# Patient Record
Sex: FEMALE | Race: ASIAN | NOT HISPANIC OR LATINO | URBAN - METROPOLITAN AREA
[De-identification: names, ages, dates, MRNs, and addresses within clinical notes are randomized per-mention and may not be internally consistent; named-entity substitution may affect disease eponyms.]

---

## 2022-03-03 ENCOUNTER — EMERGENCY (EMERGENCY)
Facility: HOSPITAL | Age: 32
LOS: 1 days | Discharge: HOME | End: 2022-03-03
Attending: EMERGENCY MEDICINE | Admitting: EMERGENCY MEDICINE
Payer: MEDICAID

## 2022-03-03 VITALS
RESPIRATION RATE: 16 BRPM | OXYGEN SATURATION: 100 % | TEMPERATURE: 98 F | DIASTOLIC BLOOD PRESSURE: 85 MMHG | HEART RATE: 94 BPM | SYSTOLIC BLOOD PRESSURE: 118 MMHG

## 2022-03-03 VITALS
RESPIRATION RATE: 18 BRPM | OXYGEN SATURATION: 100 % | SYSTOLIC BLOOD PRESSURE: 126 MMHG | HEART RATE: 84 BPM | TEMPERATURE: 98 F | DIASTOLIC BLOOD PRESSURE: 83 MMHG | WEIGHT: 132.28 LBS

## 2022-03-03 LAB
ALBUMIN SERPL ELPH-MCNC: 4 G/DL — SIGNIFICANT CHANGE UP (ref 3.3–5)
ALP SERPL-CCNC: 123 U/L — HIGH (ref 40–120)
ALT FLD-CCNC: 25 U/L — SIGNIFICANT CHANGE UP (ref 12–78)
ANION GAP SERPL CALC-SCNC: 5 MMOL/L — SIGNIFICANT CHANGE UP (ref 5–17)
APPEARANCE UR: ABNORMAL
AST SERPL-CCNC: 15 U/L — SIGNIFICANT CHANGE UP (ref 15–37)
BASOPHILS # BLD AUTO: 0.05 K/UL — SIGNIFICANT CHANGE UP (ref 0–0.2)
BASOPHILS NFR BLD AUTO: 0.7 % — SIGNIFICANT CHANGE UP (ref 0–2)
BILIRUB SERPL-MCNC: 0.3 MG/DL — SIGNIFICANT CHANGE UP (ref 0.2–1.2)
BILIRUB UR-MCNC: NEGATIVE — SIGNIFICANT CHANGE UP
BUN SERPL-MCNC: 8 MG/DL — SIGNIFICANT CHANGE UP (ref 7–23)
CALCIUM SERPL-MCNC: 9.2 MG/DL — SIGNIFICANT CHANGE UP (ref 8.5–10.1)
CHLORIDE SERPL-SCNC: 108 MMOL/L — SIGNIFICANT CHANGE UP (ref 96–108)
CO2 SERPL-SCNC: 27 MMOL/L — SIGNIFICANT CHANGE UP (ref 22–31)
COLOR SPEC: ABNORMAL
CREAT SERPL-MCNC: 0.68 MG/DL — SIGNIFICANT CHANGE UP (ref 0.5–1.3)
DIFF PNL FLD: ABNORMAL
EGFR: 119 ML/MIN/1.73M2 — SIGNIFICANT CHANGE UP
EOSINOPHIL # BLD AUTO: 0.23 K/UL — SIGNIFICANT CHANGE UP (ref 0–0.5)
EOSINOPHIL NFR BLD AUTO: 3.1 % — SIGNIFICANT CHANGE UP (ref 0–6)
GLUCOSE SERPL-MCNC: 94 MG/DL — SIGNIFICANT CHANGE UP (ref 70–99)
GLUCOSE UR QL: NEGATIVE — SIGNIFICANT CHANGE UP
HCG SERPL-ACNC: <1 MIU/ML — SIGNIFICANT CHANGE UP
HCT VFR BLD CALC: 37.4 % — SIGNIFICANT CHANGE UP (ref 34.5–45)
HGB BLD-MCNC: 12.6 G/DL — SIGNIFICANT CHANGE UP (ref 11.5–15.5)
IMM GRANULOCYTES NFR BLD AUTO: 0.7 % — SIGNIFICANT CHANGE UP (ref 0–1.5)
KETONES UR-MCNC: ABNORMAL
LEUKOCYTE ESTERASE UR-ACNC: ABNORMAL
LIDOCAIN IGE QN: 277 U/L — SIGNIFICANT CHANGE UP (ref 73–393)
LYMPHOCYTES # BLD AUTO: 2.1 K/UL — SIGNIFICANT CHANGE UP (ref 1–3.3)
LYMPHOCYTES # BLD AUTO: 28.7 % — SIGNIFICANT CHANGE UP (ref 13–44)
MCHC RBC-ENTMCNC: 29.8 PG — SIGNIFICANT CHANGE UP (ref 27–34)
MCHC RBC-ENTMCNC: 33.7 GM/DL — SIGNIFICANT CHANGE UP (ref 32–36)
MCV RBC AUTO: 88.4 FL — SIGNIFICANT CHANGE UP (ref 80–100)
MONOCYTES # BLD AUTO: 0.62 K/UL — SIGNIFICANT CHANGE UP (ref 0–0.9)
MONOCYTES NFR BLD AUTO: 8.5 % — SIGNIFICANT CHANGE UP (ref 2–14)
NEUTROPHILS # BLD AUTO: 4.26 K/UL — SIGNIFICANT CHANGE UP (ref 1.8–7.4)
NEUTROPHILS NFR BLD AUTO: 58.3 % — SIGNIFICANT CHANGE UP (ref 43–77)
NITRITE UR-MCNC: NEGATIVE — SIGNIFICANT CHANGE UP
NRBC # BLD: 0 /100 WBCS — SIGNIFICANT CHANGE UP (ref 0–0)
PH UR: 7 — SIGNIFICANT CHANGE UP (ref 5–8)
PLATELET # BLD AUTO: 291 K/UL — SIGNIFICANT CHANGE UP (ref 150–400)
POTASSIUM SERPL-MCNC: 4 MMOL/L — SIGNIFICANT CHANGE UP (ref 3.5–5.3)
POTASSIUM SERPL-SCNC: 4 MMOL/L — SIGNIFICANT CHANGE UP (ref 3.5–5.3)
PROT SERPL-MCNC: 7.5 G/DL — SIGNIFICANT CHANGE UP (ref 6–8.3)
PROT UR-MCNC: 100
RBC # BLD: 4.23 M/UL — SIGNIFICANT CHANGE UP (ref 3.8–5.2)
RBC # FLD: 13.2 % — SIGNIFICANT CHANGE UP (ref 10.3–14.5)
SODIUM SERPL-SCNC: 140 MMOL/L — SIGNIFICANT CHANGE UP (ref 135–145)
SP GR SPEC: 1.01 — SIGNIFICANT CHANGE UP (ref 1.01–1.02)
UROBILINOGEN FLD QL: NEGATIVE — SIGNIFICANT CHANGE UP
WBC # BLD: 7.31 K/UL — SIGNIFICANT CHANGE UP (ref 3.8–10.5)
WBC # FLD AUTO: 7.31 K/UL — SIGNIFICANT CHANGE UP (ref 3.8–10.5)

## 2022-03-03 PROCEDURE — 81001 URINALYSIS AUTO W/SCOPE: CPT

## 2022-03-03 PROCEDURE — 96366 THER/PROPH/DIAG IV INF ADDON: CPT

## 2022-03-03 PROCEDURE — 76830 TRANSVAGINAL US NON-OB: CPT | Mod: 26

## 2022-03-03 PROCEDURE — 80053 COMPREHEN METABOLIC PANEL: CPT

## 2022-03-03 PROCEDURE — 84702 CHORIONIC GONADOTROPIN TEST: CPT

## 2022-03-03 PROCEDURE — 74177 CT ABD & PELVIS W/CONTRAST: CPT | Mod: MA

## 2022-03-03 PROCEDURE — 85025 COMPLETE CBC W/AUTO DIFF WBC: CPT

## 2022-03-03 PROCEDURE — 74177 CT ABD & PELVIS W/CONTRAST: CPT | Mod: 26,MA

## 2022-03-03 PROCEDURE — 96365 THER/PROPH/DIAG IV INF INIT: CPT | Mod: XU

## 2022-03-03 PROCEDURE — 99285 EMERGENCY DEPT VISIT HI MDM: CPT

## 2022-03-03 PROCEDURE — 76830 TRANSVAGINAL US NON-OB: CPT

## 2022-03-03 PROCEDURE — 36415 COLL VENOUS BLD VENIPUNCTURE: CPT

## 2022-03-03 PROCEDURE — 83690 ASSAY OF LIPASE: CPT

## 2022-03-03 PROCEDURE — 96375 TX/PRO/DX INJ NEW DRUG ADDON: CPT

## 2022-03-03 PROCEDURE — 99284 EMERGENCY DEPT VISIT MOD MDM: CPT | Mod: 25

## 2022-03-03 RX ORDER — OXYCODONE AND ACETAMINOPHEN 5; 325 MG/1; MG/1
1 TABLET ORAL
Qty: 9 | Refills: 0
Start: 2022-03-03 | End: 2022-03-05

## 2022-03-03 RX ORDER — ACETAMINOPHEN 500 MG
1000 TABLET ORAL ONCE
Refills: 0 | Status: COMPLETED | OUTPATIENT
Start: 2022-03-03 | End: 2022-03-03

## 2022-03-03 RX ORDER — CEFUROXIME AXETIL 250 MG
1 TABLET ORAL
Qty: 14 | Refills: 0
Start: 2022-03-03 | End: 2022-03-09

## 2022-03-03 RX ORDER — CEFTRIAXONE 500 MG/1
1000 INJECTION, POWDER, FOR SOLUTION INTRAMUSCULAR; INTRAVENOUS ONCE
Refills: 0 | Status: COMPLETED | OUTPATIENT
Start: 2022-03-03 | End: 2022-03-03

## 2022-03-03 RX ORDER — TAMSULOSIN HYDROCHLORIDE 0.4 MG/1
1 CAPSULE ORAL
Qty: 7 | Refills: 0
Start: 2022-03-03 | End: 2022-03-09

## 2022-03-03 RX ADMIN — CEFTRIAXONE 100 MILLIGRAM(S): 500 INJECTION, POWDER, FOR SOLUTION INTRAMUSCULAR; INTRAVENOUS at 18:46

## 2022-03-03 RX ADMIN — Medication 400 MILLIGRAM(S): at 16:22

## 2022-03-03 RX ADMIN — Medication 1000 MILLIGRAM(S): at 18:45

## 2022-03-03 NOTE — ED PROVIDER NOTE - CARE PROVIDER_API CALL
Leroy Khan  UROLOGY  [address], [address]  [place] [address]  Phone: [phone]  Fax: [phone]  Follow Up Time: 1-3 Days

## 2022-03-03 NOTE — ED PROVIDER NOTE - NSFOLLOWUPINSTRUCTIONS_ED_ALL_ED_FT
Followup with Urology. Return for worsening pain, fever, vomiting, worsening condition. Follow up with urology.     Kidney Stones    WHAT YOU NEED TO KNOW:    What is a kidney stone? Kidney stones form in the urinary system when the water and waste in your urine are out of balance. When this happens, certain types of waste crystals separate from the urine. The crystals build up and form kidney stones. Kidney stones can be made of uric acid, calcium, phosphate, or oxalate crystals. You may have more than one kidney stone.    Kidney Stones          What increases my risk for kidney stones?   •Not drinking enough liquids (especially water) each day      •Having urinary tract infections often      •Too much of certain foods, such as meat, salt, nuts, and chocolate      •Obesity      •Certain medicines, such as diuretics, steroids, and antacids      •A family history of kidney stones      •Being born with a kidney or bowel disorder      What are the signs and symptoms of kidney stones?   •Pain in the middle of your back that moves across to your side or that may spread to your groin      •Nausea and vomiting      •Urge to urinate often, burning feeling when you urinate, or pink or red urine      •Tenderness in your lower back, side, or stomach      How are kidney stones diagnosed? Your healthcare provider will ask about your health and usual foods. He or she may refer you to a urologist. You may need tests to find out what type of kidney stones you have. Tests can show the size of your kidney stones and where they are in your urinary system. You may need more than one of the following:  •Urine tests may show if you have blood in your urine. They may also show high amounts of the substances that form kidney stones, such as uric acid.      •Blood tests show how well your kidneys are working. They may also be used to check the levels of calcium or uric acid in your blood.      •X-ray or ultrasound pictures may be taken of your kidneys, bladder, and ureters. You may be given contrast liquid before an x-ray to help these show up better in the pictures. You may need to have more than one x-ray. Tell the healthcare provider if you have ever had an allergic reaction to contrast liquid.      How are kidney stones treated?   •NSAIDs, such as ibuprofen, help decrease swelling, pain, and fever. This medicine is available with or without a doctor's order. NSAIDs can cause stomach bleeding or kidney problems in certain people. If you take blood thinner medicine, always ask your healthcare provider if NSAIDs are safe for you. Always read the medicine label and follow directions.      •Acetaminophen decreases pain and fever. It is available without a doctor's order. Ask how much to take and how often to take it. Follow directions. Read the labels of all other medicines you are using to see if they also contain acetaminophen, or ask your doctor or pharmacist. Acetaminophen can cause liver damage if not taken correctly. Do not use more than 4 grams (4,000 milligrams) total of acetaminophen in one day.       •Prescription pain medicine may be given. Ask your healthcare provider how to take this medicine safely. Some prescription pain medicines contain acetaminophen. Do not take other medicines that contain acetaminophen without talking to your healthcare provider. Too much acetaminophen may cause liver damage. Prescription pain medicine may cause constipation. Ask your healthcare provider how to prevent or treat constipation.       •Medicines to balance your electrolytes may be needed.      •A procedure or surgery to remove the kidney stones may be needed if they do not pass on their own. Your treatment will depend on the size and location of your kidney stones.      What can I do to manage kidney stones?   •Drink more liquids. Your healthcare provider may tell you to drink at least 8 to 12 (eight-ounce) cups of liquids each day. This helps flush out the kidney stones when you urinate. Water is the best liquid to drink.      •Strain your urine every time you go to the bathroom. Urinate through a strainer or a piece of thin cloth to catch the stones. Take the stones to your healthcare provider so they can be sent to the lab for tests. This will help your healthcare providers plan the best treatment for you.  Look for Stones in the Filter           •Ask if you should avoid any foods. You may need to limit oxalate. Oxalate is a chemical found in some plant foods. The most common type of kidney stone is made up of crystals that contain calcium and oxalate. Your healthcare provider or dietitian may recommend that you limit oxalate if you get this type of kidney stone often. You may need to limit how much sodium (salt) or protein you eat. Ask for information about the best foods for you.  High Oxalate Foods           •Be physically active as directed. Your stones may pass more easily if you stay active. Physical activity can also help you manage your weight. Ask about the best activities for you.   Family Walking for Exercise           When should I seek immediate care?   •You are vomiting and it is not relieved with medicine.          When should I call my doctor?   •You have a fever.      •You have trouble urinating.      •You see blood in your urine.      •You have severe pain.      •You have any questions or concerns about your condition or care.      CARE AGREEMENT:    You have the right to help plan your care. Learn about your health condition and how it may be treated. Discuss treatment options with your healthcare providers to decide what care you want to receive. You always have the right to refuse treatment.

## 2022-03-03 NOTE — ED PROVIDER NOTE - OBJECTIVE STATEMENT
32 y/o female without reported PMHx presents today due to left sided abdominal pain x 10 days. pt reports she started her menstruation earlier on the 19th of february. pt reports she menstruated then had hematuria. pt has an US which noted mild kidney abnormality but uncertain of what exactly. pt describes pain as aching, non-radiating, and currently 10/10. pt denies cp, sob, vomiting, diarrhea, hematochezia, dysuria, prior abdominal surgery, or any other complaints.

## 2022-03-03 NOTE — ED ADULT NURSE NOTE - OBJECTIVE STATEMENT
patient came in ED from home with c/o lower abdominal pain. patient stated a month ago she have LUQ abdominal pain. PMD made aware and ultrasound of the abdomen was completed. yesterday patient developed  lower abdominal pain. patient added LMP was Feb 19 and after the period completed patient developed hematuria and left flank pain.

## 2022-03-03 NOTE — ED PROVIDER NOTE - ATTENDING CONTRIBUTION TO CARE
31 female presents to ER c/o left sided abdominal pain, hematuria for 10 days, denies vomiting, patient alert and oriented, heart and lungs clear, abdomen soft, tender to left upper and lower quadrant, f/u ct abdomen/pelvis r/o kidney stone, ua, labs, iv fluids, pain control, re-eval.

## 2022-03-03 NOTE — ED PROVIDER NOTE - PATIENT PORTAL LINK FT
You can access the FollowMyHealth Patient Portal offered by [facility] by registering at the following website: [email or web address]. By joining [employer]’s FollowMyHealth portal, you will also be able to view your health information using other applications (apps) compatible with our system.

## 2022-03-03 NOTE — ED PROVIDER NOTE - CLINICAL SUMMARY MEDICAL DECISION MAKING FREE TEXT BOX
presents today due to left sided abdominal pain x 10 days. pt reports she started her menstruation earlier on the 19th of february. pt reports she menstruated then had hematuria. pt has an US which noted mild kidney abnormality but uncertain of what exactly. plan includes labs, Ct renal stone hunt, UA r/o UTI, US pelvis, pain control, re-assess

## 2022-03-03 NOTE — ED PROVIDER NOTE - PHYSICAL EXAMINATION
Constitutional: Awake, Alert, non-toxic. NAD. Well appearing, well nourished.   HEAD: Normocephalic, atraumatic.   EYES: EOM intact, conjunctiva and sclera are clear bilaterally.   ENT: No rhinorrhea, patent, mucous membranes pink/moist, no drooling or stridor.   NECK: Supple, non-tender  CARDIOVASCULAR: Normal S1, S2; regular rate and rhythm.  RESPIRATORY: Normal respiratory effort; breath sounds CTAB, no wheezes, rhonchi, or rales. Speaking in full sentences. No accessory muscle use.   ABDOMEN: Soft; (+) left sided abdominal TTP, no guarding or rebound TTP. no CVAT.    EXTREMITIES: Full passive and active ROM in all extremities; non-tender to palpation; distal pulses palpable and symmetric  SKIN: Warm, dry; good skin turgor, no apparent lesions or rashes, no ecchymosis, brisk capillary refill.  NEURO: A&O x3. Sensory and motor functions are grossly intact. Speech is normal. Appearance and judgement seem appropriate for gender and age.

## 2022-11-04 PROBLEM — Z78.9 OTHER SPECIFIED HEALTH STATUS: Chronic | Status: ACTIVE | Noted: 2022-03-03

## 2022-11-21 PROBLEM — Z00.00 ENCOUNTER FOR PREVENTIVE HEALTH EXAMINATION: Status: ACTIVE | Noted: 2022-11-21

## 2022-11-29 ENCOUNTER — ASOB RESULT (OUTPATIENT)
Age: 32
End: 2022-11-29

## 2022-11-29 ENCOUNTER — APPOINTMENT (OUTPATIENT)
Dept: ANTEPARTUM | Facility: CLINIC | Age: 32
End: 2022-11-29

## 2022-11-29 ENCOUNTER — LABORATORY RESULT (OUTPATIENT)
Age: 32
End: 2022-11-29

## 2022-11-29 PROCEDURE — 76813 OB US NUCHAL MEAS 1 GEST: CPT

## 2022-11-29 PROCEDURE — 76801 OB US < 14 WKS SINGLE FETUS: CPT

## 2023-01-23 ENCOUNTER — ASOB RESULT (OUTPATIENT)
Age: 33
End: 2023-01-23

## 2023-01-23 ENCOUNTER — APPOINTMENT (OUTPATIENT)
Dept: ANTEPARTUM | Facility: CLINIC | Age: 33
End: 2023-01-23
Payer: MEDICAID

## 2023-01-23 PROCEDURE — 99202 OFFICE O/P NEW SF 15 MIN: CPT | Mod: 25

## 2023-01-23 PROCEDURE — 76805 OB US >/= 14 WKS SNGL FETUS: CPT

## 2023-03-06 ENCOUNTER — APPOINTMENT (OUTPATIENT)
Dept: ANTEPARTUM | Facility: CLINIC | Age: 33
End: 2023-03-06

## 2023-04-12 ENCOUNTER — APPOINTMENT (OUTPATIENT)
Dept: ANTEPARTUM | Facility: CLINIC | Age: 33
End: 2023-04-12
Payer: MEDICAID

## 2023-04-12 ENCOUNTER — ASOB RESULT (OUTPATIENT)
Age: 33
End: 2023-04-12

## 2023-04-12 PROCEDURE — 76817 TRANSVAGINAL US OBSTETRIC: CPT

## 2023-04-12 PROCEDURE — 76816 OB US FOLLOW-UP PER FETUS: CPT

## 2023-04-12 PROCEDURE — 76819 FETAL BIOPHYS PROFIL W/O NST: CPT

## 2023-05-14 ENCOUNTER — INPATIENT (INPATIENT)
Facility: HOSPITAL | Age: 33
LOS: 0 days | Discharge: HOME | End: 2023-05-15
Attending: OBSTETRICS & GYNECOLOGY | Admitting: OBSTETRICS & GYNECOLOGY

## 2023-05-14 ENCOUNTER — APPOINTMENT (OUTPATIENT)
Dept: ANTEPARTUM | Facility: CLINIC | Age: 33
End: 2023-05-14

## 2023-05-14 VITALS — DIASTOLIC BLOOD PRESSURE: 95 MMHG | SYSTOLIC BLOOD PRESSURE: 140 MMHG | HEART RATE: 62 BPM

## 2023-05-14 DIAGNOSIS — O13.9 GESTATIONAL [PREGNANCY-INDUCED] HYPERTENSION WITHOUT SIGNIFICANT PROTEINURIA, UNSPECIFIED TRIMESTER: ICD-10-CM

## 2023-05-14 DIAGNOSIS — O26.899 OTHER SPECIFIED PREGNANCY RELATED CONDITIONS, UNSPECIFIED TRIMESTER: ICD-10-CM

## 2023-05-14 LAB
ALBUMIN SERPL ELPH-MCNC: 3.2 G/DL — LOW (ref 3.3–5)
ALP SERPL-CCNC: 173 U/L — HIGH (ref 40–120)
ALT FLD-CCNC: 20 U/L — SIGNIFICANT CHANGE UP (ref 4–33)
ANION GAP SERPL CALC-SCNC: 14 MMOL/L — SIGNIFICANT CHANGE UP (ref 7–14)
APPEARANCE UR: ABNORMAL
APTT BLD: 27.8 SEC — SIGNIFICANT CHANGE UP (ref 27–36.3)
AST SERPL-CCNC: 22 U/L — SIGNIFICANT CHANGE UP (ref 4–32)
BACTERIA # UR AUTO: ABNORMAL
BASOPHILS # BLD AUTO: 0.04 K/UL — SIGNIFICANT CHANGE UP (ref 0–0.2)
BASOPHILS NFR BLD AUTO: 0.5 % — SIGNIFICANT CHANGE UP (ref 0–2)
BILIRUB SERPL-MCNC: 0.3 MG/DL — SIGNIFICANT CHANGE UP (ref 0.2–1.2)
BILIRUB UR-MCNC: NEGATIVE — SIGNIFICANT CHANGE UP
BLD GP AB SCN SERPL QL: NEGATIVE — SIGNIFICANT CHANGE UP
BUN SERPL-MCNC: 5 MG/DL — LOW (ref 7–23)
CALCIUM SERPL-MCNC: 8.8 MG/DL — SIGNIFICANT CHANGE UP (ref 8.4–10.5)
CHLORIDE SERPL-SCNC: 103 MMOL/L — SIGNIFICANT CHANGE UP (ref 98–107)
CO2 SERPL-SCNC: 16 MMOL/L — LOW (ref 22–31)
COLOR SPEC: YELLOW — SIGNIFICANT CHANGE UP
COVID-19 SPIKE DOMAIN AB INTERP: POSITIVE
COVID-19 SPIKE DOMAIN ANTIBODY RESULT: >250 U/ML — HIGH
CREAT ?TM UR-MCNC: 84 MG/DL — SIGNIFICANT CHANGE UP
CREAT SERPL-MCNC: 0.45 MG/DL — LOW (ref 0.5–1.3)
DIFF PNL FLD: ABNORMAL
EGFR: 130 ML/MIN/1.73M2 — SIGNIFICANT CHANGE UP
EOSINOPHIL # BLD AUTO: 0.15 K/UL — SIGNIFICANT CHANGE UP (ref 0–0.5)
EOSINOPHIL NFR BLD AUTO: 1.7 % — SIGNIFICANT CHANGE UP (ref 0–6)
EPI CELLS # UR: 4 /HPF — SIGNIFICANT CHANGE UP (ref 0–5)
FIBRINOGEN PPP-MCNC: 585 MG/DL — HIGH (ref 200–465)
GLUCOSE SERPL-MCNC: 97 MG/DL — SIGNIFICANT CHANGE UP (ref 70–99)
GLUCOSE UR QL: NEGATIVE — SIGNIFICANT CHANGE UP
HCT VFR BLD CALC: 42.8 % — SIGNIFICANT CHANGE UP (ref 34.5–45)
HGB BLD-MCNC: 14 G/DL — SIGNIFICANT CHANGE UP (ref 11.5–15.5)
HYALINE CASTS # UR AUTO: 0 /LPF — SIGNIFICANT CHANGE UP (ref 0–7)
IANC: 5.59 K/UL — SIGNIFICANT CHANGE UP (ref 1.8–7.4)
IMM GRANULOCYTES NFR BLD AUTO: 1.4 % — HIGH (ref 0–0.9)
INR BLD: 0.99 RATIO — SIGNIFICANT CHANGE UP (ref 0.88–1.16)
KETONES UR-MCNC: NEGATIVE — SIGNIFICANT CHANGE UP
LDH SERPL L TO P-CCNC: 170 U/L — SIGNIFICANT CHANGE UP (ref 135–225)
LEUKOCYTE ESTERASE UR-ACNC: NEGATIVE — SIGNIFICANT CHANGE UP
LYMPHOCYTES # BLD AUTO: 1.89 K/UL — SIGNIFICANT CHANGE UP (ref 1–3.3)
LYMPHOCYTES # BLD AUTO: 21.9 % — SIGNIFICANT CHANGE UP (ref 13–44)
MCHC RBC-ENTMCNC: 31 PG — SIGNIFICANT CHANGE UP (ref 27–34)
MCHC RBC-ENTMCNC: 32.7 GM/DL — SIGNIFICANT CHANGE UP (ref 32–36)
MCV RBC AUTO: 94.9 FL — SIGNIFICANT CHANGE UP (ref 80–100)
MONOCYTES # BLD AUTO: 0.83 K/UL — SIGNIFICANT CHANGE UP (ref 0–0.9)
MONOCYTES NFR BLD AUTO: 9.6 % — SIGNIFICANT CHANGE UP (ref 2–14)
NEUTROPHILS # BLD AUTO: 5.59 K/UL — SIGNIFICANT CHANGE UP (ref 1.8–7.4)
NEUTROPHILS NFR BLD AUTO: 64.9 % — SIGNIFICANT CHANGE UP (ref 43–77)
NITRITE UR-MCNC: NEGATIVE — SIGNIFICANT CHANGE UP
NRBC # BLD: 0 /100 WBCS — SIGNIFICANT CHANGE UP (ref 0–0)
NRBC # FLD: 0 K/UL — SIGNIFICANT CHANGE UP (ref 0–0)
PH UR: 6.5 — SIGNIFICANT CHANGE UP (ref 5–8)
PLATELET # BLD AUTO: 268 K/UL — SIGNIFICANT CHANGE UP (ref 150–400)
POTASSIUM SERPL-MCNC: 4.1 MMOL/L — SIGNIFICANT CHANGE UP (ref 3.5–5.3)
POTASSIUM SERPL-SCNC: 4.1 MMOL/L — SIGNIFICANT CHANGE UP (ref 3.5–5.3)
PROT ?TM UR-MCNC: 47 MG/DL — SIGNIFICANT CHANGE UP
PROT ?TM UR-MCNC: 47 MG/DL — SIGNIFICANT CHANGE UP
PROT SERPL-MCNC: 6.3 G/DL — SIGNIFICANT CHANGE UP (ref 6–8.3)
PROT UR-MCNC: ABNORMAL
PROT/CREAT UR-RTO: 0.6 RATIO — HIGH (ref 0–0.2)
PROTHROM AB SERPL-ACNC: 11.5 SEC — SIGNIFICANT CHANGE UP (ref 10.5–13.4)
RBC # BLD: 4.51 M/UL — SIGNIFICANT CHANGE UP (ref 3.8–5.2)
RBC # FLD: 13.3 % — SIGNIFICANT CHANGE UP (ref 10.3–14.5)
RBC CASTS # UR COMP ASSIST: 6 /HPF — HIGH (ref 0–4)
RH IG SCN BLD-IMP: POSITIVE — SIGNIFICANT CHANGE UP
RH IG SCN BLD-IMP: POSITIVE — SIGNIFICANT CHANGE UP
SARS-COV-2 IGG+IGM SERPL QL IA: >250 U/ML — HIGH
SARS-COV-2 IGG+IGM SERPL QL IA: POSITIVE
SODIUM SERPL-SCNC: 133 MMOL/L — LOW (ref 135–145)
SP GR SPEC: 1.02 — SIGNIFICANT CHANGE UP (ref 1.01–1.05)
T PALLIDUM AB TITR SER: NEGATIVE — SIGNIFICANT CHANGE UP
URATE SERPL-MCNC: 5.5 MG/DL — SIGNIFICANT CHANGE UP (ref 2.5–7)
UROBILINOGEN FLD QL: SIGNIFICANT CHANGE UP
WBC # BLD: 8.62 K/UL — SIGNIFICANT CHANGE UP (ref 3.8–10.5)
WBC # FLD AUTO: 8.62 K/UL — SIGNIFICANT CHANGE UP (ref 3.8–10.5)
WBC UR QL: 4 /HPF — SIGNIFICANT CHANGE UP (ref 0–5)

## 2023-05-14 RX ORDER — SIMETHICONE 80 MG/1
80 TABLET, CHEWABLE ORAL EVERY 4 HOURS
Refills: 0 | Status: DISCONTINUED | OUTPATIENT
Start: 2023-05-14 | End: 2023-05-15

## 2023-05-14 RX ORDER — TETANUS TOXOID, REDUCED DIPHTHERIA TOXOID AND ACELLULAR PERTUSSIS VACCINE, ADSORBED 5; 2.5; 8; 8; 2.5 [IU]/.5ML; [IU]/.5ML; UG/.5ML; UG/.5ML; UG/.5ML
0.5 SUSPENSION INTRAMUSCULAR ONCE
Refills: 0 | Status: DISCONTINUED | OUTPATIENT
Start: 2023-05-14 | End: 2023-05-15

## 2023-05-14 RX ORDER — DIPHENHYDRAMINE HCL 50 MG
25 CAPSULE ORAL EVERY 6 HOURS
Refills: 0 | Status: DISCONTINUED | OUTPATIENT
Start: 2023-05-14 | End: 2023-05-15

## 2023-05-14 RX ORDER — MAGNESIUM HYDROXIDE 400 MG/1
30 TABLET, CHEWABLE ORAL
Refills: 0 | Status: DISCONTINUED | OUTPATIENT
Start: 2023-05-14 | End: 2023-05-15

## 2023-05-14 RX ORDER — CHLORHEXIDINE GLUCONATE 213 G/1000ML
1 SOLUTION TOPICAL DAILY
Refills: 0 | Status: DISCONTINUED | OUTPATIENT
Start: 2023-05-14 | End: 2023-05-14

## 2023-05-14 RX ORDER — PRAMOXINE HYDROCHLORIDE 150 MG/15G
1 AEROSOL, FOAM RECTAL EVERY 4 HOURS
Refills: 0 | Status: DISCONTINUED | OUTPATIENT
Start: 2023-05-14 | End: 2023-05-15

## 2023-05-14 RX ORDER — KETOROLAC TROMETHAMINE 30 MG/ML
30 SYRINGE (ML) INJECTION ONCE
Refills: 0 | Status: DISCONTINUED | OUTPATIENT
Start: 2023-05-14 | End: 2023-05-14

## 2023-05-14 RX ORDER — DIBUCAINE 1 %
1 OINTMENT (GRAM) RECTAL EVERY 6 HOURS
Refills: 0 | Status: DISCONTINUED | OUTPATIENT
Start: 2023-05-14 | End: 2023-05-15

## 2023-05-14 RX ORDER — OXYTOCIN 10 UNIT/ML
VIAL (ML) INJECTION
Qty: 20 | Refills: 0 | Status: DISCONTINUED | OUTPATIENT
Start: 2023-05-14 | End: 2023-05-15

## 2023-05-14 RX ORDER — BENZOCAINE 10 %
1 GEL (GRAM) MUCOUS MEMBRANE EVERY 6 HOURS
Refills: 0 | Status: DISCONTINUED | OUTPATIENT
Start: 2023-05-14 | End: 2023-05-15

## 2023-05-14 RX ORDER — SODIUM CHLORIDE 9 MG/ML
1000 INJECTION, SOLUTION INTRAVENOUS
Refills: 0 | Status: DISCONTINUED | OUTPATIENT
Start: 2023-05-14 | End: 2023-05-14

## 2023-05-14 RX ORDER — LANOLIN
1 OINTMENT (GRAM) TOPICAL EVERY 6 HOURS
Refills: 0 | Status: DISCONTINUED | OUTPATIENT
Start: 2023-05-14 | End: 2023-05-15

## 2023-05-14 RX ORDER — AER TRAVELER 0.5 G/1
1 SOLUTION RECTAL; TOPICAL EVERY 4 HOURS
Refills: 0 | Status: DISCONTINUED | OUTPATIENT
Start: 2023-05-14 | End: 2023-05-15

## 2023-05-14 RX ORDER — OXYTOCIN 10 UNIT/ML
41.67 VIAL (ML) INJECTION
Qty: 20 | Refills: 0 | Status: DISCONTINUED | OUTPATIENT
Start: 2023-05-14 | End: 2023-05-15

## 2023-05-14 RX ORDER — OXYCODONE HYDROCHLORIDE 5 MG/1
5 TABLET ORAL
Refills: 0 | Status: DISCONTINUED | OUTPATIENT
Start: 2023-05-14 | End: 2023-05-15

## 2023-05-14 RX ORDER — HYDROCORTISONE 1 %
1 OINTMENT (GRAM) TOPICAL EVERY 6 HOURS
Refills: 0 | Status: DISCONTINUED | OUTPATIENT
Start: 2023-05-14 | End: 2023-05-15

## 2023-05-14 RX ORDER — ACETAMINOPHEN 500 MG
975 TABLET ORAL
Refills: 0 | Status: DISCONTINUED | OUTPATIENT
Start: 2023-05-14 | End: 2023-05-15

## 2023-05-14 RX ORDER — IBUPROFEN 200 MG
600 TABLET ORAL EVERY 6 HOURS
Refills: 0 | Status: DISCONTINUED | OUTPATIENT
Start: 2023-05-14 | End: 2023-05-15

## 2023-05-14 RX ORDER — SODIUM CHLORIDE 9 MG/ML
3 INJECTION INTRAMUSCULAR; INTRAVENOUS; SUBCUTANEOUS EVERY 8 HOURS
Refills: 0 | Status: DISCONTINUED | OUTPATIENT
Start: 2023-05-14 | End: 2023-05-15

## 2023-05-14 RX ORDER — OXYTOCIN 10 UNIT/ML
VIAL (ML) INJECTION
Qty: 30 | Refills: 0 | Status: DISCONTINUED | OUTPATIENT
Start: 2023-05-14 | End: 2023-05-14

## 2023-05-14 RX ORDER — OXYCODONE HYDROCHLORIDE 5 MG/1
5 TABLET ORAL ONCE
Refills: 0 | Status: DISCONTINUED | OUTPATIENT
Start: 2023-05-14 | End: 2023-05-15

## 2023-05-14 RX ORDER — IBUPROFEN 200 MG
600 TABLET ORAL EVERY 6 HOURS
Refills: 0 | Status: COMPLETED | OUTPATIENT
Start: 2023-05-14 | End: 2024-04-11

## 2023-05-14 RX ADMIN — Medication 2 MILLIUNIT(S)/MIN: at 15:26

## 2023-05-14 RX ADMIN — Medication 30 MILLIGRAM(S): at 15:35

## 2023-05-14 RX ADMIN — Medication 30 MILLIGRAM(S): at 16:00

## 2023-05-14 RX ADMIN — SODIUM CHLORIDE 3 MILLILITER(S): 9 INJECTION INTRAMUSCULAR; INTRAVENOUS; SUBCUTANEOUS at 21:52

## 2023-05-14 NOTE — OB RN DELIVERY SUMMARY - NS_SEPSISRSKCALC_OBGYN_ALL_OB_FT
EOS calculated successfully. EOS Risk Factor: 0.5/1000 live births ([facility] national incidence); GA=37w6d; Temp=98.4; ROM=3.15; GBS='Negative'; Antibiotics='No antibiotics or any antibiotics < 2 hrs prior to birth'

## 2023-05-14 NOTE — OB PROVIDER TRIAGE NOTE - HISTORY OF PRESENT ILLNESS
32yo  @ 37.6 wks c/o painful contractions, denies vb or lof, +GFM. AP course uncomplicated thus far. denies fever chills ha n/v new swelling vision changes cp sob or cough. last saw OB  2cm in office. pt reports feels better when walks around.    GBS negative  meds: folic acid PNV iron  all: denies  PMH: denies  PSH: denies  gyn hx: denies  ob hx:  2017 in [facility]

## 2023-05-14 NOTE — OB PROVIDER H&P - ASSESSMENT
This is a 33 year old  at 37.6 weeks gestational age admitted for labor and gestational hypertension    Plan discussed with Dr Chayito Stratton at bedside reexamining patient, now 3.5/80/-2 with bulging bag  HELLP labs wnl, PC Ratio 0.6, moderate blood  routine orders  expectant management

## 2023-05-14 NOTE — OB PROVIDER DELIVERY SUMMARY - NSPROVIDERDELIVERYNOTE_OBGYN_ALL_OB_FT
of a viable male infant   apgars  9,9  No complicatiosn   clear  fluid   second degree vagianl laceration repaired with 2/0 chromic  placenta intact and complete      Baby and mother stable   no complication   rectum and vagina INTACT

## 2023-05-14 NOTE — OB PROVIDER TRIAGE NOTE - NSHPPHYSICALEXAM_GEN_ALL_CORE
abd soft gravid NT  CV RRR  LS clear bilaterally  TAS: vertex  SVE: 2/50/-3  FHT: moderate variability + accelerations + variable  toco: q 5-10 minutes  Vital Signs Last 24 Hrs  T(C): 36.5 (14 May 2023 06:01), Max: 36.5 (14 May 2023 05:43)  T(F): 97.7 (14 May 2023 06:01), Max: 97.7 (14 May 2023 05:43)  HR: 68 (14 May 2023 06:44) (62 - 83)  BP: 126/88 (14 May 2023 06:44) (126/88 - 140/95)  BP(mean): --  RR: 18 (14 May 2023 06:01) (18 - 18)  SpO2: --    Parameters below as of 14 May 2023 06:01  Patient On (Oxygen Delivery Method): room air

## 2023-05-14 NOTE — OB PROVIDER H&P - NSLOWPPHRISK_OBGYN_A_OB
No previous uterine incision/[place] Pregnancy/Less than or equal to 4 previous vaginal births/No known bleeding disorder/No history of postpartum hemorrhage/No other PPH risks indicated

## 2023-05-14 NOTE — OB RN DELIVERY SUMMARY - NSSELHIDDEN_OBGYN_ALL_OB_FT
[NS_DeliveryAttending1_OBGYN_ALL_OB_FT:[id number]],[NS_DeliveryRN_OBGYN_ALL_OB_FT:[id number]]

## 2023-05-14 NOTE — OB RN PATIENT PROFILE - NS_DIETPREF_OBGYN_ALL_OB
- ?COPD vs ADHF   - Continue home spiriva  - Albuterol rescue inahler PRN for wheezing  - Oxygen as needed to maintain sp02 >88% (on home O2)  - Monitor respiratory status for any acute change      Vegetarian

## 2023-05-14 NOTE — OB RN PATIENT PROFILE - FALL HARM RISK - UNIVERSAL INTERVENTIONS
Bed in lowest position, wheels locked, appropriate side rails in place/Call bell, personal items and telephone in reach/Instruct patient to call for assistance before getting out of bed or chair/Non-slip footwear when patient is out of bed/[place] to call system/Physically safe environment - no spills, clutter or unnecessary equipment/Purposeful Proactive Rounding/Room/bathroom lighting operational, light cord in reach

## 2023-05-14 NOTE — OB RN PATIENT PROFILE - POST PARTUM DEPRESSION SCREEN OB 4
no Preparation Of Recipient Site - Graft: The eschar was removed surgically with sharp dissection to facilitate appropriate survival of the following graft.

## 2023-05-14 NOTE — OB PROVIDER H&P - HISTORY OF PRESENT ILLNESS
32yo  @ 37.6 wks c/o painful contractions, denies vb or lof, +GFM. AP course uncomplicated thus far. denies fever chills ha n/v new swelling vision changes cp sob or cough. last saw OB  2cm in office. pt reports feels better when walks around.    GBS negative  meds: folic acid PNV iron  all: denies  PMH: denies  PSH: denies  gyn hx: denies  ob hx:  2017 in [facility]

## 2023-05-15 ENCOUNTER — TRANSCRIPTION ENCOUNTER (OUTPATIENT)
Age: 33
End: 2023-05-15

## 2023-05-15 VITALS
HEART RATE: 96 BPM | TEMPERATURE: 98 F | SYSTOLIC BLOOD PRESSURE: 137 MMHG | RESPIRATION RATE: 17 BRPM | DIASTOLIC BLOOD PRESSURE: 87 MMHG | OXYGEN SATURATION: 100 %

## 2023-05-15 RX ORDER — IBUPROFEN 200 MG
1 TABLET ORAL
Qty: 0 | Refills: 0 | DISCHARGE
Start: 2023-05-15

## 2023-05-15 RX ORDER — ACETAMINOPHEN 500 MG
3 TABLET ORAL
Qty: 0 | Refills: 0 | DISCHARGE
Start: 2023-05-15

## 2023-05-15 RX ADMIN — Medication 975 MILLIGRAM(S): at 09:26

## 2023-05-15 RX ADMIN — Medication 975 MILLIGRAM(S): at 21:27

## 2023-05-15 RX ADMIN — Medication 1 TABLET(S): at 11:34

## 2023-05-15 RX ADMIN — Medication 975 MILLIGRAM(S): at 10:00

## 2023-05-15 RX ADMIN — Medication 975 MILLIGRAM(S): at 22:00

## 2023-05-15 RX ADMIN — MAGNESIUM HYDROXIDE 30 MILLILITER(S): 400 TABLET, CHEWABLE ORAL at 09:01

## 2023-05-15 RX ADMIN — SODIUM CHLORIDE 3 MILLILITER(S): 9 INJECTION INTRAMUSCULAR; INTRAVENOUS; SUBCUTANEOUS at 05:40

## 2023-05-15 NOTE — DISCHARGE NOTE OB - ADDITIONAL INSTRUCTIONS
Follow up in OB office in 1 week for blood pressure check.  Follow up in 6 weeks for postpartum checkup.

## 2023-05-15 NOTE — DISCHARGE NOTE OB - PATIENT PORTAL LINK FT
You can access the FollowMyHealth Patient Portal offered by [facility] by registering at the following website: [email or web address]. By joining [employer]’s FollowMyHealth portal, you will also be able to view your health information using other applications (apps) compatible with our system.

## 2023-05-15 NOTE — DISCHARGE NOTE OB - HOSPITAL COURSE
33 y.o  admitted for labor and met criteria for gHTN on admission. Uncomplicated  on . HELLP labs wnl, P/C ratio 0.6, however bloody. BPs wnl. Patient discharged home with blood pressure monitoring instructions and will f/u in OB office in 1 week.

## 2023-05-15 NOTE — DISCHARGE NOTE OB - CARE PROVIDER_API CALL
Ana Stratton)  Obstetrics and Gynecology  [address], [address]  [place] [address]  Phone: [phone]  Fax: [phone]  Follow Up Time:

## 2023-05-15 NOTE — DISCHARGE NOTE OB - PLAN OF CARE
Follow up in the office in 6 weeks for a postpartum visit. Regular diet. Resume normal activity as tolerated. No heavy lifting, driving, or strenuous activity for 2 weeks.  Nothing per vagina such as tampons, intercourse, douches or tub baths for 6 weeks or until you see your doctor. Call your doctor with any signs and symptoms of infection such as fever, chills, nausea or vomiting. Call your doctor if you're unable to tolerate food, increase in vaginal bleeding or have difficulty urinating. Call your doctor if you have pain that is not relieved by your prescribed medications. Notify your doctor with any other concerns.  your prescribed blood pressure monitor from [facility] @ [employer] (1st floor, back of [employer] shop).  Follow-up in your OB office within 1 week for a blood pressure check.    Please take your blood pressure 3x/day. Call your doctor if your blood pressure is 140/90 or higher [140 (top number) OR 90 (bottom number)]. Return to hospital if your blood pressure is 160/110 or higher [160 (top number)  (bottom number)]. Call your doctor if you experience symptoms such as headache, blurry vision, epigastric pain, severe swelling or nausea/vomiting.

## 2023-05-15 NOTE — DISCHARGE NOTE OB - MEDICATION SUMMARY - MEDICATIONS TO STOP TAKING
I will STOP taking the medications listed below when I get home from the hospital:    cefuroxime 500 mg oral tablet  -- 1 tab(s) by mouth 2 times a day   -- Finish all this medication unless otherwise directed by prescriber.  Medication should be taken with plenty of water.  Take with food or milk.    oxycodone-acetaminophen 5 mg-325 mg oral tablet  -- 1 tab(s) by mouth every 8 hours, As Needed MDD:3  -- Caution federal law prohibits the transfer of this drug to any person other  than the person for whom it was prescribed.  May cause drowsiness.  Alcohol may intensify this effect.  Use care when operating dangerous machinery.  This prescription cannot be refilled.  This product contains acetaminophen.  Do not use  with any other product containing acetaminophen to prevent possible liver damage.  Using more of this medication than prescribed may cause serious breathing problems.    Flomax 0.4 mg oral capsule  -- 1 cap(s) by mouth once a day   -- It is very important that you take or use this exactly as directed.  Do not skip doses or discontinue unless directed by your doctor.  May cause drowsiness.  Alcohol may intensify this effect.  Use care when operating dangerous machinery.  Some non-prescription drugs may aggravate your condition.  Read all labels carefully.  If a warning appears, check with your doctor before taking.  Swallow whole.  Do not crush.

## 2023-05-15 NOTE — PROGRESS NOTE ADULT - SUBJECTIVE AND OBJECTIVE BOX
patient reevaluated   having moderate uterine contractions  every 5-6 minutes   patient is unable to tolerate labor pains   wants Epidural   Patient evaluated   /72 pulse 57   patient is anxious   FHR cat 1  pelvic exam Cx is 4cms 90% Vertex -2 membrane Present   AROM  done   clear fluid   EFW 3100  to 3200 grams   Plan   epidural   pitocin   patient agrees 
Anesthesia Post-op Note    POD#1 S/P     Patient is doing well.  OOBAA. Tolerating clears.  Pain is tolerable.  No residual anesthetic issues or complications noted.  
PPD 1  c/o no complaints   has  hemorrhoids  I examined  ehr   /78  temp 37.6 pulse 88   breast  feeding   uterus firm well contracted   lochia normal   inf ext negative  IMP   PP    stable   Breast feeding   PP care and hemorrhoid care told   D/C  home today 
S: 33 y.o PPD#1 s/p , c/b gHTN. Patient feels well today. Pain is well controlled. She is tolerating a regular diet and passing flatus. She is voiding spontaneously, and ambulating without difficulty. Denies headache, visual changes, epigastric pain, RUQ pain, CP, SOB, lightheadedness, dizziness, N/V.    O:  Vital Signs Last 24 Hrs  T(C): 36.4 (15 May 2023 09:33), Max: 37.1 (15 May 2023 02:00)  T(F): 97.5 (15 May 2023 09:33), Max: 98.7 (15 May 2023 02:00)  HR: 94 (15 May 2023 09:33) (57 - 111)  BP: 116/82 (15 May 2023 09:33) (107/66 - 177/110)  RR: 19 (15 May 2023 09:33) (17 - 19)  SpO2: 97% (15 May 2023 09:33) (92% - 100%)    Parameters below as of 15 May 2023 09:33  Patient On (Oxygen Delivery Method): room air      MEDICATIONS  (STANDING):  acetaminophen     Tablet .. 975 milliGRAM(s) Oral <User Schedule>  diphtheria/tetanus/pertussis (acellular) Vaccine (Adacel) 0.5 milliLiter(s) IntraMuscular once  ibuprofen  Tablet. 600 milliGRAM(s) Oral every 6 hours  oxytocin Infusion 41.667 milliUNIT(s)/Min (125 mL/Hr) IV Continuous <Continuous>  oxytocin Infusion  milliUNIT(s)/Min (1000 mL/Hr) IV Continuous <Continuous>  prenatal multivitamin 1 Tablet(s) Oral daily  sodium chloride 0.9% lock flush 3 milliLiter(s) IV Push every 8 hours    MEDICATIONS  (PRN):  benzocaine 20%/menthol 0.5% Spray 1 Spray(s) Topical every 6 hours PRN for Perineal discomfort  dibucaine 1% Ointment 1 Application(s) Topical every 6 hours PRN Perineal discomfort  diphenhydrAMINE 25 milliGRAM(s) Oral every 6 hours PRN Pruritus  hydrocortisone 1% Cream 1 Application(s) Topical every 6 hours PRN Moderate Pain (4-6)  lanolin Ointment 1 Application(s) Topical every 6 hours PRN nipple soreness  magnesium hydroxide Suspension 30 milliLiter(s) Oral two times a day PRN Constipation  oxyCODONE    IR 5 milliGRAM(s) Oral every 3 hours PRN Moderate to Severe Pain (4-10)  oxyCODONE    IR 5 milliGRAM(s) Oral once PRN Moderate to Severe Pain (4-10)  pramoxine 1%/zinc 5% Cream 1 Application(s) Topical every 4 hours PRN Moderate Pain (4-6)  simethicone 80 milliGRAM(s) Chew every 4 hours PRN Gas  witch hazel Pads 1 Application(s) Topical every 4 hours PRN Perineal discomfort      Labs:  Blood type: B Positive  Rubella IgG: RPR: Negative                          14.0   8.62 >-----------< 268    (  @ 06:30 )             42.8    23 @ 06:30      133<L>  |  103  |  5<L>  ----------------------------<  97  4.1   |  16<L>  |  0.45<L>        Ca    8.8      14 May 2023 06:30    TPro  6.3  /  Alb  3.2<L>  /  TBili  0.3  /  DBili  x   /  AST  22  /  ALT  20  /  AlkPhos  173<H>  23 @ 06:30      Physical Exam:  General: NAD  Breasts: soft, nontender, non-engorged, b/l nipples intact  Abdomen: soft, non-tender, non-distended, fundus firm  Vaginal: Lochia light  Extremities: No erythema, edema, or calf tenderness    A/P: 33 y.o PPD#1 s/p , c/b gHTN. Patient is stable and doing well post-partum.      #gHTN  -BPs overnight 110s/60s-70s  -No signs/symptoms of PEC  -HELLP labs wnl, P/C 0.6 (bloody) ()  -Prescription for electronic blood pressure monitor sent to [facility] @ [facility]. Instructions on BP monitoring, BP parameters, and signs/symptoms of PEC reviewed with patient who verbalizes understanding.  -F/u in office within 1 week for BP check    #Postpartum  -Pain well controlled, continue current pain regimen  -Increase ambulation  -Continue regular diet    Arlyn Brown WHNP-BC
33 years old  EDC 23  for  labor   She has been  having labor pains since last 2 days   She was  checked in office yesterday was  2cms 50%  vertex -3   was  sent home   She  was  having irregular  contractions   Today  at 5 am contractions became mor e  and she came in   FHr cat 1   irregular contractions   BP ha s been borderline   she is very anxious   On admission pelvic exam wa s same so observed  BP is labile  goe sup when ha scontractiosn But then is normal   Prenatal   Normal BP, all labs  are WNL   Gr B strep negative   First    I  examine d he r  uncomfortable lying down    I  examine d her   FHR cat 1  contractiosn every  3-4 minutes moderate  patient  uncomfortable  with contractions   EFW  3100 grams   pelvic exam 3-4 cms 80% Vertex -3  Bulging bag   Patient in labor , early   admit   expect    need pain managemnet
patient feeling pressure   got epidural  no relief in pain   feels pain and pressure   FHR cat 1  good contractions   Pelvic exam cx is 6 cms Vertex at 0 station   Pt told  will deliver soon   expect

## 2023-05-15 NOTE — DISCHARGE NOTE OB - MATERIALS PROVIDED
Vaccinations/[facility] [place] Screening Program/[place]  Immunization Record/Breastfeeding Log/Bottle Feeding Log/Breastfeeding Mother’s Support Group Information/Guide to Postpartum Care/[facility] Hearing Screen Program/Back To Sleep Handout/Shaken Baby Prevention Handout/Breastfeeding Guide and Packet/Birth Certificate Instructions/Discharge Medication Information for Patients and Families Pocket Guide/MMR Vaccination (VIS Pub Date: 2012)/Tdap Vaccination (VIS Pub Date: 2012)

## 2023-05-15 NOTE — DISCHARGE NOTE OB - MEDICATION SUMMARY - MEDICATIONS TO TAKE
I will START or STAY ON the medications listed below when I get home from the hospital:    blood pressure monitor  -- Electronic Blood Pressure monitor x1.  Please check your blood pressure 3x/day. Notify your doctor for blood pressure readings 140/90 or greater. Return to hospital for blood pressures greater than 160/110  -- Indication: For Gestational hypertension    ibuprofen 600 mg oral tablet  -- 1 tab(s) by mouth every 6 hours as needed for  moderate pain  -- Indication: For Pain    acetaminophen 325 mg oral tablet  -- 3 tab(s) by mouth every 6 hours as needed for  mild pain  -- Indication: For Pain    Prenatal Multivitamins with Folic Acid 1 mg oral tablet  -- 1 tab(s) by mouth once a day  -- Indication: For Supplement

## 2023-05-16 ENCOUNTER — NON-APPOINTMENT (OUTPATIENT)
Age: 33
End: 2023-05-16

## 2023-05-16 RX ORDER — PRENATAL VIT NO.126/IRON/FOLIC 28MG-0.8MG
28-0.8 TABLET ORAL DAILY
Refills: 0 | Status: ACTIVE | COMMUNITY
Start: 2023-05-16

## 2023-05-18 ENCOUNTER — NON-APPOINTMENT (OUTPATIENT)
Age: 33
End: 2023-05-18

## 2023-05-22 ENCOUNTER — NON-APPOINTMENT (OUTPATIENT)
Age: 33
End: 2023-05-22

## 2023-05-31 ENCOUNTER — NON-APPOINTMENT (OUTPATIENT)
Age: 33
End: 2023-05-31

## 2023-06-05 ENCOUNTER — NON-APPOINTMENT (OUTPATIENT)
Age: 33
End: 2023-06-05

## 2023-06-09 ENCOUNTER — NON-APPOINTMENT (OUTPATIENT)
Age: 33
End: 2023-06-09

## 2023-06-09 ENCOUNTER — APPOINTMENT (OUTPATIENT)
Dept: CARDIOLOGY | Facility: CLINIC | Age: 33
End: 2023-06-09
Payer: MEDICAID

## 2023-06-09 VITALS
BODY MASS INDEX: 29.83 KG/M2 | HEART RATE: 90 BPM | WEIGHT: 158 LBS | HEIGHT: 61 IN | DIASTOLIC BLOOD PRESSURE: 85 MMHG | SYSTOLIC BLOOD PRESSURE: 129 MMHG | OXYGEN SATURATION: 97 %

## 2023-06-09 DIAGNOSIS — O13.9 GESTATIONAL [PREGNANCY-INDUCED] HYPERTENSION W/OUT SIGNIFICANT PROTEINURIA, UNSPECIFIED TRIMESTER: ICD-10-CM

## 2023-06-09 PROCEDURE — 93000 ELECTROCARDIOGRAM COMPLETE: CPT

## 2023-06-09 PROCEDURE — 99203 OFFICE O/P NEW LOW 30 MIN: CPT | Mod: 25

## 2023-06-09 NOTE — DISCUSSION/SUMMARY
[EKG obtained to assist in diagnosis and management of assessed problem(s)] : EKG obtained to assist in diagnosis and management of assessed problem(s) [FreeTextEntry1] : 33 year old woman  at 37.6 weeks on 2023 with course complicated by GHTN.She developed HTN on day of delivery. No issues since that time.\par No fu needed at this time\par Diet and exercise disccused

## 2023-06-12 ENCOUNTER — NON-APPOINTMENT (OUTPATIENT)
Age: 33
End: 2023-06-12

## 2023-07-27 NOTE — DISCHARGE NOTE OB - NS DC ANGIO PCI YN
Dr. Jackson,   Any other recommendations prior to calling patient?  
No additional recommendations at this time.  We will do routine monitoring of his echocardiogram every 2 years.  
Please notify patient that echo shows normal heart function with EF 65%  Mild aortic insufficiency    
RN called  [phone] Patient with help from [facility] [employer]; ID number [id number]. (Faroese)      RN called patient's son Gab # [phone]    No answer 1st attempt.     We called again- second attempt.         (When patient or son calls back- please relay:  Results: notify patient that echo shows normal heart function with EF 65%  Mild aortic insufficiency    Rec: By Dr Jackson: No additional recommendations at this time.  We will do routine monitoring of his echocardiogram every 2 years.)                
Rn sent patient mychart message with results  (Portal notification set up to notify RN if unread)      
no

## 2024-05-17 NOTE — HISTORY OF PRESENT ILLNESS
[FreeTextEntry1] : 33 year old woman  at 37.6 weeks on 2023 with course complicated by GHTN.She developed HTN on day of delivery. No issues since that time.\par Checks BP at home and is normal 120s/70-80s\par EKG normal
No

## 2024-09-25 NOTE — OB PROVIDER IHI INDUCTION/AUGMENTATION NOTE - NS_CHECKALL_OBGYN_ALL_OB
Order was written/H&P was completed/Contractions pattern was reviewed/FHR was reviewed/Induction / Augmentation was discussed Yes

## 2025-05-09 ENCOUNTER — NON-APPOINTMENT (OUTPATIENT)
Age: 35
End: 2025-05-09